# Patient Record
Sex: FEMALE | Race: WHITE
[De-identification: names, ages, dates, MRNs, and addresses within clinical notes are randomized per-mention and may not be internally consistent; named-entity substitution may affect disease eponyms.]

---

## 2020-03-31 PROBLEM — Z00.00 ENCOUNTER FOR PREVENTIVE HEALTH EXAMINATION: Status: ACTIVE | Noted: 2020-03-31

## 2020-04-27 ENCOUNTER — APPOINTMENT (OUTPATIENT)
Dept: COLORECTAL SURGERY | Facility: CLINIC | Age: 50
End: 2020-04-27

## 2020-08-10 ENCOUNTER — APPOINTMENT (OUTPATIENT)
Dept: COLORECTAL SURGERY | Facility: CLINIC | Age: 50
End: 2020-08-10
Payer: COMMERCIAL

## 2020-08-10 VITALS
HEIGHT: 61 IN | HEART RATE: 95 BPM | OXYGEN SATURATION: 100 % | DIASTOLIC BLOOD PRESSURE: 81 MMHG | BODY MASS INDEX: 26.91 KG/M2 | WEIGHT: 142.5 LBS | TEMPERATURE: 100.7 F | SYSTOLIC BLOOD PRESSURE: 150 MMHG

## 2020-08-10 DIAGNOSIS — L29.0 PRURITUS ANI: ICD-10-CM

## 2020-08-10 PROCEDURE — 46600 DIAGNOSTIC ANOSCOPY SPX: CPT

## 2020-08-10 PROCEDURE — 99203 OFFICE O/P NEW LOW 30 MIN: CPT | Mod: 25

## 2020-08-10 RX ORDER — PROGESTERONE 100 MG/1
100 CAPSULE ORAL
Refills: 0 | Status: ACTIVE | COMMUNITY

## 2020-08-10 NOTE — ASSESSMENT
[FreeTextEntry1] : Pt concerned about perianal redness.  No real symptoms elicited.\par She is worried and has tried many different creams.\par Had C scope.\par \par Found no real rash. Very mild barely perceptible red spot.\par She has hair in area and I found notable soiling.\par Urged her to shower or water clean area after BM and to avoid overcleaning (which she may be doing now).\par She is asymptomatic and doesn’t need to treat the perianal area.\par If she develops symptoms then would treat.\par \par Given pruritus information sheets (full).\par To call for problems

## 2020-08-10 NOTE — PHYSICAL EXAM
[None] : no anal fissures seen [Nonprolapsing] : a nonprolapsing (grade I) [Skin Tags] : residual hemorrhoidal skin tags were noted [Normal] : was normal [Normal Breath Sounds] : Normal breath sounds [Normal Heart Sounds] : normal heart sounds [2+] : left 2+ [No Rash or Lesion] : No rash or lesion [Alert] : alert [Oriented to Person] : oriented to person [Oriented to Place] : oriented to place [Oriented to Time] : oriented to time [Calm] : calm [Abdomen Masses] : No abdominal masses [Abdomen Tenderness] : ~T No ~M abdominal tenderness [Excoriation] : no perianal excoriation [Wart] : no warts [Fistula] : no fistulas [Ulcer ___ cm] : no ulcers [Tender, Swollen] : nontender, non-swollen [Thrombosed] : that was not thrombosed [Gross Blood] : no gross blood [Stool Sample Taken] : no stool obtained on rectal exam [JVD] : no jugular venous distention  [Thyroid] : the thyroid was abnormal [Carotid Bruits] : no carotid bruits [de-identified] : lower transverse incision, well healed, scaphoid abdomen, no masses, no tenderness [de-identified] : external: some hair in the region and also some stool residue externally (small pieces).  skin has very mild red spots (hard to notice) but no clearcut rash.  There are no skin creaks or ulcers noted.  No induration or EO.  Has 3 < 1 cm skin tags that are not inflamed [de-identified] : digital: small L posterior papillae noted, tone WNL, no masses.  Squeeze is ok.   [de-identified] : as described above [de-identified] : NAD [de-identified] : anoscopy: grade 1 L lateral posterior hemorrhodi

## 2020-08-10 NOTE — HISTORY OF PRESENT ILLNESS
[FreeTextEntry1] : 49 yo woman who 1 year ago took treatment for H pylori (diagnosised on EGD for anemia and iron deficiency)) x 2 weeks.  Then had vaginal yeast infection.  Also had perianal skin rash (red) without pain or itching.  Rarely had minor itching. Has tried numerous hydrocortisone cream.  Tried ketokinazole and nystatin (clotrimizole).  The anti-fungals helped a little. \par \par last C scope .  No polyps.  No family hx for colon cancer\par \par \par \par \par \par PSH\par C sections only\par \par PMH\par cardiac: no angina, MI, HTN, SOB, palpiatiaons\par pulm: never smoked, no asthma, bornchitis\par No DM\par no hepatitis, PUD or gastritis \par G/U: no UTI, stones, hematuria\par GYN: heavy long periods, lat month took progesteron to stop the heavy period  (in close contact with GYN)\par no endocrine or thryoid issues\par \par Meds\par Fe pills\par progesterone\par \par Allergies:  Dicloxacillin